# Patient Record
(demographics unavailable — no encounter records)

---

## 2025-03-27 NOTE — DISCUSSION/SUMMARY
[FreeTextEntry1] : 1.  Pericarditis: Mostly resolved will obtain an echocardiogram sed rate and advise once results are known.  EKG reviewed sinus rhythm incomplete right bundle branch block otherwise within normal limits 2.  Hypertriglyceridemia: Continue fenofibrate and follow-up with PCP for routine medical matters. [EKG obtained to assist in diagnosis and management of assessed problem(s)] : EKG obtained to assist in diagnosis and management of assessed problem(s)

## 2025-03-27 NOTE — HISTORY OF PRESENT ILLNESS
[FreeTextEntry1] : 31-year-old male with past medical history of hypertriglyceridemia and hypothyroidism comes in for evaluation of chest discomfort.  Patient reports 4 days ago in the morning a upper left chest discomfort rating to the shoulder worse at the end of a deep breath.  Symptoms seem to dayami until the evening when he had a second episode which then called EMS and his emergency room.  Patient was without ACS and discharged home.  Since then his symptoms have mostly resolved.  There was some worsening of pain early on when laying down better when sitting up which is also now resolved.

## 2025-03-27 NOTE — PHYSICAL EXAM
